# Patient Record
Sex: FEMALE | Race: WHITE | URBAN - METROPOLITAN AREA
[De-identification: names, ages, dates, MRNs, and addresses within clinical notes are randomized per-mention and may not be internally consistent; named-entity substitution may affect disease eponyms.]

---

## 2021-03-08 ENCOUNTER — TRANSFERRED RECORDS (OUTPATIENT)
Dept: HEALTH INFORMATION MANAGEMENT | Facility: CLINIC | Age: 46
End: 2021-03-08

## 2021-03-09 ENCOUNTER — TRANSFERRED RECORDS (OUTPATIENT)
Dept: HEALTH INFORMATION MANAGEMENT | Facility: CLINIC | Age: 46
End: 2021-03-09

## 2021-03-15 ENCOUNTER — TRANSFERRED RECORDS (OUTPATIENT)
Dept: HEALTH INFORMATION MANAGEMENT | Facility: CLINIC | Age: 46
End: 2021-03-15

## 2021-03-17 ENCOUNTER — TRANSFERRED RECORDS (OUTPATIENT)
Dept: HEALTH INFORMATION MANAGEMENT | Facility: CLINIC | Age: 46
End: 2021-03-17

## 2021-03-17 NOTE — TELEPHONE ENCOUNTER
DIAGNOSIS: Left Multi ligament knee damage, referred by Dr. Zia Espinal at [facility]   APPOINTMENT DATE: 3/22/2021   NOTES STATUS DETAILS   OFFICE NOTE from referring provider Care Everywhere [facility]:  3/15/21, 3/8/21 - ORTHO OV with Dr. Quick   OFFICE NOTE from other specialist N/A    DISCHARGE SUMMARY from hospital N/A    DISCHARGE REPORT from the ER N/A    OPERATIVE REPORT N/A    MEDICATION LIST Care Everywhere    EMG (for Spine) N/A    IMPLANT RECORD/STICKER N/A    LABS     CBC/DIFF Care Everywhere 5/14/18   CULTURES N/A    INJECTIONS DONE IN RADIOLOGY N/A    MRI recieved [facility]:  3/9/21 - MRI Knee   CT SCAN N/A    XRAYS (IMAGES & REPORTS) recieved [facility]:  3/8/21 - XR Knee, Left   TUMOR     PATHOLOGY  Slides & report N/A      Records Requested  03/17/21    Facility  [facility]  Fax: [phone]   Outcome * 3/17/21 12:59 PM Faxed urg req to [facility] for images to be pushed to [facility]. - Yodit     Action 3/17/21 RH   Action Taken Imaging received and resolved in PACS

## 2021-03-22 ENCOUNTER — OFFICE VISIT (OUTPATIENT)
Dept: ORTHOPEDICS | Facility: CLINIC | Age: 46
End: 2021-03-22

## 2021-03-22 ENCOUNTER — PRE VISIT (OUTPATIENT)
Dept: ORTHOPEDICS | Facility: CLINIC | Age: 46
End: 2021-03-22

## 2021-03-22 VITALS — WEIGHT: 180 LBS | HEIGHT: 64 IN | BODY MASS INDEX: 30.73 KG/M2

## 2021-03-22 DIAGNOSIS — Z11.59 ENCOUNTER FOR SCREENING FOR OTHER VIRAL DISEASES: ICD-10-CM

## 2021-03-22 DIAGNOSIS — S83.512A RUPTURE OF ANTERIOR CRUCIATE LIGAMENT OF LEFT KNEE, INITIAL ENCOUNTER: Primary | ICD-10-CM

## 2021-03-22 PROCEDURE — 99204 OFFICE O/P NEW MOD 45 MIN: CPT | Mod: GC | Performed by: STUDENT IN AN ORGANIZED HEALTH CARE EDUCATION/TRAINING PROGRAM

## 2021-03-22 RX ORDER — ACETAMINOPHEN 325 MG/1
650 TABLET ORAL EVERY 6 HOURS PRN
COMMUNITY
End: 2021-04-06

## 2021-03-22 ASSESSMENT — MIFFLIN-ST. JEOR: SCORE: 1446.47

## 2021-03-22 NOTE — LETTER
[facility] [employer]  [address], [address]  [place] [address]  Fax: [phone]  Phone: [phone]      2021      Vandana Carrera  [address]  [place] [address]        Dear Vandana Carrera,    Thank you for your interest in becoming a [employer] user!    Your access code is: [email or web address]  Expires: 2021  6:31 AM     Please access the [employer] website at [email or web address].  Below the ID and password fields, select the  Sign Up Now  as New User.  You will be prompted to enter the access code listed above as well as additional personal information.  Please follow the directions carefully when creating your username and password.    If you allow your access code to , or if you have any questions please call a [employer] Representative at [phone] during normal clinic hours.     Sincerely,      [employer]  [facility]

## 2021-03-22 NOTE — PROGRESS NOTES
CHIEF CONCERN:   Chief Complaint   Patient presents with     Consult     Left knee pain        HISTORY:   Vandana Carrera is a 45 year old female who presents to clinic today for evaluation of left knee pain. 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  This caused a violent twist of her knee.  She had to extend her knee and felt like her knee popped back into place.  She was evaluated by after Dr. Zia Espinal was concerned about her posterior lateral corner and MCL and referred her here for further management.  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  Denies any numbness or tingling.      PAST MEDICAL HISTORY: (Reviewed with the patient and in the [facility] medical record)  No past medical history on file.    PAST SURGICAL HISTORY: (Reviewed with the patient and in the [facility] medical record)  No past surgical history on file.    MEDICATIONS: (Reviewed with the patient and in the [facility] medical record)  Notable medications include:  Current Outpatient Medications   Medication Sig Dispense Refill     acetaminophen (TYLENOL) 325 MG tablet Take 650 mg by mouth every 6 hours as needed for mild pain          ALLERGIES: (Reviewed with the patient and in the [facility] medical record)  No Known Allergies    SOCIAL HISTORY: (Reviewed with the patient and in the medical record)  --Tobacco: No  --Occupation: Rancher with beef cattle  --Avocation/Sport: Ranching, horse riding, downhill skiing    FAMILY HISTORY: (Reviewed with the patient and in the medical record)  -- No family history of bleeding, clotting, or difficulty with anesthesia    REVIEW OF SYSTEMS: (Reviewed with the patient and on the health intake form)  -- A comprehensive 10 point review of systems was conducted and is negative except as noted in the HPI    EXAM:   General: Awake, Alert and Oriented, No acute Distress. Articulate  "and Interactive  Ht 1.626 m (5' 4\")   Wt 81.6 kg (180 lb)   BMI 30.90 kg/m       Left lower extremity and knee exam:    Skin is Warm and Well perfused, no suggestion of infection, no previous incisions, bruising present    Injury motion limited by pain.  5 to 50 degrees today    Stable to varus stress.  Opens to valgus stress at 0 and 30 degrees    2B Lachman, negative posterior drawer    Neurovascularly intact distally    IMAGING:  Plain Radiographs: Radiographs of the left knee from 3/8/21  were independently reviewed by me and findings were discussed with the patient today. The imaging demonstrates minimal degeneration and no fractures.    MRI: MRI of the left knee from 3/9/21 were independently reviewed by me and findings were discussed with the patient today. The imaging demonstrates a fully torn proximal MCL and fully torn ACL.     ASSESSMENT:  1. 45 year old female with left ACL tear and left complete proximal MCL tear    PLAN:  1. Operative and nonoperative options were discussed.  The risks and benefits were also discussed.  She would like to proceed with a hamstring autograft ACL reconstruction and an allograft MCL reconstruction with possible meniscal surgery.  Aspirin will be prescribed after the procedure.    Kee Wolfe MD  Fellow, Sports Medicine & Shoulder  [facility]    "

## 2021-03-22 NOTE — PROGRESS NOTES
Patient seen and examined with the resident.     Assesment: Grade 3 rupture of the left anterior cruciate ligament    Grade 3 rupture the left medial collateral ligament    Plan: I long discussion with the patient regarding her left knee.  Reviewed the diagnosis potential treatment options.  I recommend the following course of action: Examination under anesthesia left knee, left knee arthroscopy, ACL reconstruction hamstring autograft, medial collateral ligament reconstruction with allograft.  Meniscus surgery.  I reviewed with her the risk benefits complication techniques and alternatives.  We reviewed expected course of recovery alternative treatment options.  We will look for time to schedule this can be complete.    I agree with history, physical and imaging as well as the assessment and plan as detailed by Dr. Wolfe.

## 2021-03-22 NOTE — LETTER
3/22/2021         RE: Vandana Carrera  [address]  [place] [address]        Dear Colleague,    Thank you for referring your patient, Vandana Carrera, to the [facility]. Please see a copy of my visit note below.    Patient seen and examined with the resident.     Assesment: Grade 3 rupture of the left anterior cruciate ligament    Grade 3 rupture the left medial collateral ligament    Plan: I long discussion with the patient regarding her left knee.  Reviewed the diagnosis potential treatment options.  I recommend the following course of action: Examination under anesthesia left knee, left knee arthroscopy, ACL reconstruction hamstring autograft, medial collateral ligament reconstruction with allograft.  Meniscus surgery.  I reviewed with her the risk benefits complication techniques and alternatives.  We reviewed expected course of recovery alternative treatment options.  We will look for time to schedule this can be complete.    I agree with history, physical and imaging as well as the assessment and plan as detailed by Dr. Wolfe.       CHIEF CONCERN:   Chief Complaint   Patient presents with     Consult     Left knee pain        HISTORY:   Vandana Carrera is a 45 year old female who presents to clinic today for evaluation of left knee pain. 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  This caused a violent twist of her knee.  She had to extend her knee and felt like her knee popped back into place.  She was evaluated by after Dr. Zia Espinal was concerned about her posterior lateral corner and MCL and referred her here for further management.  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  Denies any numbness or tingling.      PAST MEDICAL HISTORY: (Reviewed with the patient and in the [facility] medical record)  No  "past medical history on file.    PAST SURGICAL HISTORY: (Reviewed with the patient and in the [facility] medical record)  No past surgical history on file.    MEDICATIONS: (Reviewed with the patient and in the [facility] medical record)  Notable medications include:  Current Outpatient Medications   Medication Sig Dispense Refill     acetaminophen (TYLENOL) 325 MG tablet Take 650 mg by mouth every 6 hours as needed for mild pain          ALLERGIES: (Reviewed with the patient and in the [facility] medical record)  No Known Allergies    SOCIAL HISTORY: (Reviewed with the patient and in the medical record)  --Tobacco: No  --Occupation: Rancher with beef cattle  --Avocation/Sport: Ranching, horse riding, downhill skiing    FAMILY HISTORY: (Reviewed with the patient and in the medical record)  -- No family history of bleeding, clotting, or difficulty with anesthesia    REVIEW OF SYSTEMS: (Reviewed with the patient and on the health intake form)  -- A comprehensive 10 point review of systems was conducted and is negative except as noted in the HPI    EXAM:   General: Awake, Alert and Oriented, No acute Distress. Articulate and Interactive  Ht 1.626 m (5' 4\")   Wt 81.6 kg (180 lb)   BMI 30.90 kg/m       Left lower extremity and knee exam:    Skin is Warm and Well perfused, no suggestion of infection, no previous incisions, bruising present    Injury motion limited by pain.  5 to 50 degrees today    Stable to varus stress.  Opens to valgus stress at 0 and 30 degrees    2B Lachman, negative posterior drawer    Neurovascularly intact distally    IMAGING:  Plain Radiographs: Radiographs of the left knee from 3/8/21  were independently reviewed by me and findings were discussed with the patient today. The imaging demonstrates minimal degeneration and no fractures.    MRI: MRI of the left knee from 3/9/21 were independently reviewed by me and findings were discussed with the patient today. The imaging demonstrates a fully torn proximal MCL and " fully torn ACL.     ASSESSMENT:  1. 45 year old female with left ACL tear and left complete proximal MCL tear    PLAN:  1. Operative and nonoperative options were discussed.  The risks and benefits were also discussed.  She would like to proceed with a hamstring autograft ACL reconstruction and an allograft MCL reconstruction with possible meniscal surgery.  Aspirin will be prescribed after the procedure.    Kee Wolfe MD  Fellow, Sports Medicine & Shoulder  [facility]        Again, thank you for allowing me to participate in the care of your patient.        Sincerely,        Azar Redd MD

## 2021-03-22 NOTE — NURSING NOTE
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 Health history positive only for long clotting time.      Person(s) involved in teaching:   Patient and      Motivation Level:  Asks Questions: Yes  Eager to Learn: Yes  Cooperative: Yes  Receptive (willing/able to accept information): Yes  Any cultural factors/Adventist beliefs that may influence understanding or compliance? No     Patient and Family demonstrates understanding of the following:  Reason for the appointment, diagnosis and treatment plan: Yes  Knowledge of proper use of medications and conditions for which they are ordered (with special attention to potential side effects or drug interactions): Yes  Which situations necessitate calling provider and whom to contact: Yes     Teaching Concerns Addressed: They understand patient will need a preop exam within 30 days of the date of surgery and to begin physical therapy 3-5 days postop.     Proper use and care of brace/crutches (medical equip, care aids, etc.): Yes  Nutritional needs and diet plan: Yes  Pain management techniques: Yes  Wound Care: Yes  How and/when to access community resources: Yes     Instructional Materials Used/Given: Preoperative teaching packet, surgical soap x2.

## 2021-03-22 NOTE — LETTER
3/22/2021      RE: Vandana Carrera  [address]  [place] [address]       Patient seen and examined with the resident.     Assesment: Grade 3 rupture of the left anterior cruciate ligament    Grade 3 rupture the left medial collateral ligament    Plan: I long discussion with the patient regarding her left knee.  Reviewed the diagnosis potential treatment options.  I recommend the following course of action: Examination under anesthesia left knee, left knee arthroscopy, ACL reconstruction hamstring autograft, medial collateral ligament reconstruction with allograft.  Meniscus surgery.  I reviewed with her the risk benefits complication techniques and alternatives.  We reviewed expected course of recovery alternative treatment options.  We will look for time to schedule this can be complete.    I agree with history, physical and imaging as well as the assessment and plan as detailed by Dr. Wolfe.       CHIEF CONCERN:   Chief Complaint   Patient presents with     Consult     Left knee pain        HISTORY:   Vandana Carrera is a 45 year old female who presents to clinic today for evaluation of left knee pain. 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  This caused a violent twist of her knee.  She had to extend her knee and felt like her knee popped back into place.  She was evaluated by after Dr. Zia Espinal was concerned about her posterior lateral corner and MCL and referred her here for further management.  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  Denies any numbness or tingling.      PAST MEDICAL HISTORY: (Reviewed with the patient and in the [facility] medical record)  No past medical history on file.    PAST SURGICAL HISTORY: (Reviewed with the patient and in the [facility] medical record)  No past surgical history on file.    MEDICATIONS: (Reviewed with the  "patient and in the [facility] medical record)  Notable medications include:  Current Outpatient Medications   Medication Sig Dispense Refill     acetaminophen (TYLENOL) 325 MG tablet Take 650 mg by mouth every 6 hours as needed for mild pain          ALLERGIES: (Reviewed with the patient and in the [facility] medical record)  No Known Allergies    SOCIAL HISTORY: (Reviewed with the patient and in the medical record)  --Tobacco: No  --Occupation: Rancher with beef cattle  --Avocation/Sport: Ranching, horse riding, downhill skiing    FAMILY HISTORY: (Reviewed with the patient and in the medical record)  -- No family history of bleeding, clotting, or difficulty with anesthesia    REVIEW OF SYSTEMS: (Reviewed with the patient and on the health intake form)  -- A comprehensive 10 point review of systems was conducted and is negative except as noted in the HPI    EXAM:   General: Awake, Alert and Oriented, No acute Distress. Articulate and Interactive  Ht 1.626 m (5' 4\")   Wt 81.6 kg (180 lb)   BMI 30.90 kg/m       Left lower extremity and knee exam:    Skin is Warm and Well perfused, no suggestion of infection, no previous incisions, bruising present    Injury motion limited by pain.  5 to 50 degrees today    Stable to varus stress.  Opens to valgus stress at 0 and 30 degrees    2B Lachman, negative posterior drawer    Neurovascularly intact distally    IMAGING:  Plain Radiographs: Radiographs of the left knee from 3/8/21  were independently reviewed by me and findings were discussed with the patient today. The imaging demonstrates minimal degeneration and no fractures.    MRI: MRI of the left knee from 3/9/21 were independently reviewed by me and findings were discussed with the patient today. The imaging demonstrates a fully torn proximal MCL and fully torn ACL.     ASSESSMENT:  1. 45 year old female with left ACL tear and left complete proximal MCL tear    PLAN:  1. Operative and nonoperative options were discussed.  The risks " and benefits were also discussed.  She would like to proceed with a hamstring autograft ACL reconstruction and an allograft MCL reconstruction with possible meniscal surgery.  Aspirin will be prescribed after the procedure.    Kee Wolfe MD  Fellow, Sports Medicine & Shoulder  [facility]        Azar Redd MD

## 2021-03-29 ENCOUNTER — TELEPHONE (OUTPATIENT)
Dept: ORTHOPEDICS | Facility: CLINIC | Age: 46
End: 2021-03-29

## 2021-03-29 NOTE — TELEPHONE ENCOUNTER
Called pt and informed her that I faxed her PT order to the fax number provided. rightfax marked as received/sent. Faxed protocol. Called pt and confirm surgery date, explained why we can't confirm a specific time.     Jossie Magdaleno ATC

## 2021-03-29 NOTE — TELEPHONE ENCOUNTER
Received call from patient to confirm the date of her surgery with Dr Redd. Patient was told she will receive a call 1-2 days before surgery to confirm her check in time, but she should plan to arrive at 8:10am. [place]. Patient is scheduled for her pre-op H&P and COVID test for Friday, April 2nd. Fax number provided to patient to send test results.

## 2021-03-29 NOTE — TELEPHONE ENCOUNTER
[facility] Call Center    Phone Message    May a detailed message be left on voicemail: yes     Reason for Call: Other: Please fax physical therapy order to [facility] at [phone]. Patient also requested that someone call her back to confirm her date and time of surgery.     Action Taken: Message routed to:  [facility] Orthopedics    Travel Screening: Not Applicable

## 2021-04-01 SDOH — HEALTH STABILITY: MENTAL HEALTH: HOW OFTEN DO YOU HAVE A DRINK CONTAINING ALCOHOL?: NEVER

## 2021-04-02 ENCOUNTER — TRANSFERRED RECORDS (OUTPATIENT)
Dept: HEALTH INFORMATION MANAGEMENT | Facility: CLINIC | Age: 46
End: 2021-04-02

## 2021-04-04 ENCOUNTER — ANESTHESIA EVENT (OUTPATIENT)
Dept: SURGERY | Facility: AMBULATORY SURGERY CENTER | Age: 46
End: 2021-04-04

## 2021-04-04 NOTE — ANESTHESIA PREPROCEDURE EVALUATION
Anesthesia Pre-Procedure Evaluation    Patient: Vandana Carrera   MRN: [id number] : 1975        Preoperative Diagnosis: Rupture of anterior cruciate ligament of left knee, initial encounter [S83.512A]   Procedure : Procedure(s):  left knee examination under anesthesia, knee arthroscopy, anterior cruciate ligament reconstruction hamstring autograft  left knee meniscus surgery  medial collateral ligament reconstruction with allograft     No past medical history on file.   History reviewed. No pertinent surgical history.   No Known Allergies   Social History     Tobacco Use     Smoking status: Never Smoker     Smokeless tobacco: Never Used   Substance Use Topics     Alcohol use: Never     Frequency: Never      Wt Readings from Last 1 Encounters:   21 81.6 kg (180 lb)        Anesthesia Evaluation   Pt has had prior anesthetic. Type: General.    No history of anesthetic complications       ROS/MED HX  ENT/Pulmonary:  - neg pulmonary ROS     Neurologic:  - neg neurologic ROS     Cardiovascular:  - neg cardiovascular ROS     METS/Exercise Tolerance: >4 METS Comment: Works on a ranch   Hematologic: Comments: Patient bruises/bleeds easily. Preoperative PTT, INR, CBC all within normal limits.       Musculoskeletal: Comment: ACL tear      GI/Hepatic:  - neg GI/hepatic ROS     Renal/Genitourinary:  - neg Renal ROS     Endo:  - neg endo ROS     Psychiatric/Substance Use:  - neg psychiatric ROS     Infectious Disease:  - neg infectious disease ROS     Malignancy:  - neg malignancy ROS     Other:  - neg other ROS          Physical Exam    Airway        Mallampati: I   TM distance: > 3 FB   Neck ROM: full   Mouth opening: > 3 cm    Respiratory Devices and Support         Dental  no notable dental history         Cardiovascular   cardiovascular exam normal          Pulmonary   pulmonary exam normal                OUTSIDE LABS:  CBC: No results found for: WBC, HGB, HCT, PLT  BMP: No results found for: NA, POTASSIUM,  CHLORIDE, CO2, BUN, CR, GLC  COAGS: No results found for: PTT, INR, FIBR  POC: No results found for: BGM, HCG, HCGS  HEPATIC: No results found for: ALBUMIN, PROTTOTAL, ALT, AST, GGT, ALKPHOS, BILITOTAL, BILIDIRECT, MARYCHUY  OTHER: No results found for: PH, LACT, A1C, MARBIN, PHOS, MAG, LIPASE, AMYLASE, TSH, T4, T3, CRP, SED    Anesthesia Plan    ASA Status:  2   NPO Status:  NPO Appropriate    Anesthesia Type: General.     - Airway: LMA   Induction: Intravenous.   Maintenance: Other.        Consents    Anesthesia Plan(s) and associated risks, benefits, and realistic alternatives discussed. Questions answered and patient/representative(s) expressed understanding.     - Discussed with:  Patient         Postoperative Care    Pain management: Oral pain medications, IV analgesics, Peripheral nerve block (Single Shot).   PONV prophylaxis: Ondansetron (or other 5HT-3), Dexamethasone or Solumedrol, Background Propofol Infusion     Comments:    Discussed risks of general anesthesia, including aspiration pneumonia, sore throat/hoarse voice, abrasions/damage to lips/tongue/teeth, nausea, rare complications (including medication reactions, cardiac, pulmonary).  Discussed preoperative adductor canal nerve block. Patient would like to see how she does without nerve block. She did consent to an adductor canal nerve block in PACU if needed. She would like plain bupivacaine for the block (rather than liposomal bupivacaine).      H&P reviewed: Unable to attach H&P to encounter due to EHR limitations. H&P Update: appropriate H&P reviewed, patient examined. No interval changes since H&P (within 30 days).         Norma Clement MD

## 2021-04-05 RX ORDER — NALOXONE HYDROCHLORIDE 0.4 MG/ML
0.2 INJECTION, SOLUTION INTRAMUSCULAR; INTRAVENOUS; SUBCUTANEOUS
Status: DISCONTINUED | OUTPATIENT
Start: 2021-04-05 | End: 2021-04-07 | Stop reason: HOSPADM

## 2021-04-05 RX ORDER — NALOXONE HYDROCHLORIDE 0.4 MG/ML
0.4 INJECTION, SOLUTION INTRAMUSCULAR; INTRAVENOUS; SUBCUTANEOUS
Status: DISCONTINUED | OUTPATIENT
Start: 2021-04-05 | End: 2021-04-07 | Stop reason: HOSPADM

## 2021-04-06 ENCOUNTER — ANESTHESIA (OUTPATIENT)
Dept: SURGERY | Facility: AMBULATORY SURGERY CENTER | Age: 46
End: 2021-04-06

## 2021-04-06 ENCOUNTER — ANCILLARY PROCEDURE (OUTPATIENT)
Dept: RADIOLOGY | Facility: AMBULATORY SURGERY CENTER | Age: 46
End: 2021-04-06
Attending: ORTHOPAEDIC SURGERY

## 2021-04-06 ENCOUNTER — HOSPITAL ENCOUNTER (OUTPATIENT)
Facility: AMBULATORY SURGERY CENTER | Age: 46
Discharge: HOME | End: 2021-04-06
Attending: ORTHOPAEDIC SURGERY | Admitting: ORTHOPAEDIC SURGERY

## 2021-04-06 VITALS
OXYGEN SATURATION: 98 % | WEIGHT: 190 LBS | TEMPERATURE: 98.2 F | HEIGHT: 64 IN | HEART RATE: 75 BPM | BODY MASS INDEX: 32.44 KG/M2 | RESPIRATION RATE: 12 BRPM | DIASTOLIC BLOOD PRESSURE: 91 MMHG | SYSTOLIC BLOOD PRESSURE: 132 MMHG

## 2021-04-06 DIAGNOSIS — S83.512A RUPTURE OF ANTERIOR CRUCIATE LIGAMENT OF LEFT KNEE, INITIAL ENCOUNTER: Primary | ICD-10-CM

## 2021-04-06 DIAGNOSIS — R52 PAIN: ICD-10-CM

## 2021-04-06 DIAGNOSIS — S83.512A RUPTURE OF ANTERIOR CRUCIATE LIGAMENT OF LEFT KNEE, INITIAL ENCOUNTER: ICD-10-CM

## 2021-04-06 LAB
APTT PPP: 31 SEC (ref 22–37)
ERYTHROCYTE [DISTWIDTH] IN BLOOD BY AUTOMATED COUNT: 12.9 % (ref 10–15)
HCG UR QL: NEGATIVE
HCT VFR BLD AUTO: 42.6 % (ref 35–47)
HGB BLD-MCNC: 14.4 G/DL (ref 11.7–15.7)
INR PPP: 0.96 (ref 0.86–1.14)
INTERNAL QC OK POCT: YES
MCH RBC QN AUTO: 28.9 PG (ref 26.5–33)
MCHC RBC AUTO-ENTMCNC: 33.8 G/DL (ref 31.5–36.5)
MCV RBC AUTO: 85 FL (ref 78–100)
PLATELET # BLD AUTO: 210 10E9/L (ref 150–450)
RBC # BLD AUTO: 4.99 10E12/L (ref 3.8–5.2)
WBC # BLD AUTO: 4.8 10E9/L (ref 4–11)

## 2021-04-06 PROCEDURE — 81025 URINE PREGNANCY TEST: CPT | Performed by: PATHOLOGY

## 2021-04-06 PROCEDURE — 29888 ARTHRS AID ACL RPR/AGMNTJ: CPT | Mod: LT

## 2021-04-06 PROCEDURE — 27427 RECONSTRUCTION KNEE: CPT | Mod: LT,51

## 2021-04-06 DEVICE — IMP ANCHOR ARTHREX ACL TIGHTROPE REPAIR RT W/SU AR-1588RT-J: Type: IMPLANTABLE DEVICE | Site: KNEE | Status: FUNCTIONAL

## 2021-04-06 DEVICE — IMP WASHER ARTHREX SPIKED FOR CANC SCR 18MM AR-1349L: Type: IMPLANTABLE DEVICE | Site: KNEE | Status: FUNCTIONAL

## 2021-04-06 DEVICE — IMPLANTABLE DEVICE: Type: IMPLANTABLE DEVICE | Site: KNEE | Status: FUNCTIONAL

## 2021-04-06 DEVICE — GRAFT TENDON SEMITENDINOSUS 26CM 430355: Type: IMPLANTABLE DEVICE | Site: KNEE | Status: FUNCTIONAL

## 2021-04-06 RX ORDER — OXYCODONE HYDROCHLORIDE 5 MG/1
5-10 TABLET ORAL EVERY 4 HOURS PRN
Qty: 20 TABLET | Refills: 0 | Status: SHIPPED | OUTPATIENT
Start: 2021-04-06

## 2021-04-06 RX ORDER — CEFAZOLIN SODIUM 2 G/50ML
2 SOLUTION INTRAVENOUS
Status: COMPLETED | OUTPATIENT
Start: 2021-04-06 | End: 2021-04-06

## 2021-04-06 RX ORDER — GLYCOPYRROLATE 0.2 MG/ML
INJECTION, SOLUTION INTRAMUSCULAR; INTRAVENOUS PRN
Status: DISCONTINUED | OUTPATIENT
Start: 2021-04-06 | End: 2021-04-06

## 2021-04-06 RX ORDER — SODIUM CHLORIDE, SODIUM LACTATE, POTASSIUM CHLORIDE, CALCIUM CHLORIDE 600; 310; 30; 20 MG/100ML; MG/100ML; MG/100ML; MG/100ML
INJECTION, SOLUTION INTRAVENOUS CONTINUOUS
Status: DISCONTINUED | OUTPATIENT
Start: 2021-04-06 | End: 2021-04-06 | Stop reason: HOSPADM

## 2021-04-06 RX ORDER — GABAPENTIN 300 MG/1
300 CAPSULE ORAL ONCE
Status: COMPLETED | OUTPATIENT
Start: 2021-04-06 | End: 2021-04-06

## 2021-04-06 RX ORDER — LIDOCAINE 40 MG/G
CREAM TOPICAL
Status: DISCONTINUED | OUTPATIENT
Start: 2021-04-06 | End: 2021-04-06 | Stop reason: HOSPADM

## 2021-04-06 RX ORDER — OXYCODONE HYDROCHLORIDE 5 MG/1
5 TABLET ORAL EVERY 4 HOURS PRN
Status: DISCONTINUED | OUTPATIENT
Start: 2021-04-06 | End: 2021-04-07 | Stop reason: HOSPADM

## 2021-04-06 RX ORDER — FENTANYL CITRATE 50 UG/ML
25-50 INJECTION, SOLUTION INTRAMUSCULAR; INTRAVENOUS
Status: DISCONTINUED | OUTPATIENT
Start: 2021-04-06 | End: 2021-04-06 | Stop reason: HOSPADM

## 2021-04-06 RX ORDER — CEFAZOLIN SODIUM 2 G/50ML
2 SOLUTION INTRAVENOUS SEE ADMIN INSTRUCTIONS
Status: DISCONTINUED | OUTPATIENT
Start: 2021-04-06 | End: 2021-04-06 | Stop reason: HOSPADM

## 2021-04-06 RX ORDER — SODIUM CHLORIDE, SODIUM LACTATE, POTASSIUM CHLORIDE, CALCIUM CHLORIDE 600; 310; 30; 20 MG/100ML; MG/100ML; MG/100ML; MG/100ML
INJECTION, SOLUTION INTRAVENOUS CONTINUOUS
Status: DISCONTINUED | OUTPATIENT
Start: 2021-04-06 | End: 2021-04-07 | Stop reason: HOSPADM

## 2021-04-06 RX ORDER — DEXAMETHASONE SODIUM PHOSPHATE 4 MG/ML
INJECTION, SOLUTION INTRA-ARTICULAR; INTRALESIONAL; INTRAMUSCULAR; INTRAVENOUS; SOFT TISSUE PRN
Status: DISCONTINUED | OUTPATIENT
Start: 2021-04-06 | End: 2021-04-06

## 2021-04-06 RX ORDER — AMOXICILLIN 250 MG
1-2 CAPSULE ORAL 2 TIMES DAILY
Qty: 30 TABLET | Refills: 0 | Status: SHIPPED | OUTPATIENT
Start: 2021-04-06

## 2021-04-06 RX ORDER — PROPOFOL 10 MG/ML
INJECTION, EMULSION INTRAVENOUS CONTINUOUS PRN
Status: DISCONTINUED | OUTPATIENT
Start: 2021-04-06 | End: 2021-04-06

## 2021-04-06 RX ORDER — NALOXONE HYDROCHLORIDE 0.4 MG/ML
0.4 INJECTION, SOLUTION INTRAMUSCULAR; INTRAVENOUS; SUBCUTANEOUS
Status: DISCONTINUED | OUTPATIENT
Start: 2021-04-06 | End: 2021-04-07 | Stop reason: HOSPADM

## 2021-04-06 RX ORDER — NALOXONE HYDROCHLORIDE 0.4 MG/ML
0.2 INJECTION, SOLUTION INTRAMUSCULAR; INTRAVENOUS; SUBCUTANEOUS
Status: DISCONTINUED | OUTPATIENT
Start: 2021-04-06 | End: 2021-04-07 | Stop reason: HOSPADM

## 2021-04-06 RX ORDER — KETOROLAC TROMETHAMINE 30 MG/ML
INJECTION, SOLUTION INTRAMUSCULAR; INTRAVENOUS PRN
Status: DISCONTINUED | OUTPATIENT
Start: 2021-04-06 | End: 2021-04-06

## 2021-04-06 RX ORDER — ACETAMINOPHEN 325 MG/1
650 TABLET ORAL EVERY 4 HOURS
Qty: 100 TABLET | Refills: 1 | Status: SHIPPED | OUTPATIENT
Start: 2021-04-06

## 2021-04-06 RX ORDER — IBUPROFEN 600 MG/1
600 TABLET, FILM COATED ORAL EVERY 6 HOURS
Qty: 30 TABLET | Refills: 1 | Status: SHIPPED | OUTPATIENT
Start: 2021-04-06

## 2021-04-06 RX ORDER — ONDANSETRON 4 MG/1
4-8 TABLET, ORALLY DISINTEGRATING ORAL EVERY 8 HOURS PRN
Qty: 20 TABLET | Refills: 0 | Status: SHIPPED | OUTPATIENT
Start: 2021-04-06

## 2021-04-06 RX ORDER — ASPIRIN 81 MG/1
162 TABLET, CHEWABLE ORAL DAILY
Qty: 60 TABLET | Refills: 0 | Status: SHIPPED | OUTPATIENT
Start: 2021-04-07

## 2021-04-06 RX ORDER — ACETAMINOPHEN 325 MG/1
650 TABLET ORAL
Status: DISCONTINUED | OUTPATIENT
Start: 2021-04-06 | End: 2021-04-07 | Stop reason: HOSPADM

## 2021-04-06 RX ORDER — HYDROMORPHONE HYDROCHLORIDE 1 MG/ML
.3-.5 INJECTION, SOLUTION INTRAMUSCULAR; INTRAVENOUS; SUBCUTANEOUS EVERY 10 MIN PRN
Status: DISCONTINUED | OUTPATIENT
Start: 2021-04-06 | End: 2021-04-07 | Stop reason: HOSPADM

## 2021-04-06 RX ORDER — LIDOCAINE HYDROCHLORIDE 20 MG/ML
INJECTION, SOLUTION INFILTRATION; PERINEURAL PRN
Status: DISCONTINUED | OUTPATIENT
Start: 2021-04-06 | End: 2021-04-06

## 2021-04-06 RX ORDER — FLUMAZENIL 0.1 MG/ML
0.2 INJECTION, SOLUTION INTRAVENOUS
Status: DISCONTINUED | OUTPATIENT
Start: 2021-04-06 | End: 2021-04-06 | Stop reason: HOSPADM

## 2021-04-06 RX ORDER — ONDANSETRON 2 MG/ML
4 INJECTION INTRAMUSCULAR; INTRAVENOUS EVERY 30 MIN PRN
Status: DISCONTINUED | OUTPATIENT
Start: 2021-04-06 | End: 2021-04-07 | Stop reason: HOSPADM

## 2021-04-06 RX ORDER — MEPERIDINE HYDROCHLORIDE 25 MG/ML
12.5 INJECTION INTRAMUSCULAR; INTRAVENOUS; SUBCUTANEOUS
Status: DISCONTINUED | OUTPATIENT
Start: 2021-04-06 | End: 2021-04-07 | Stop reason: HOSPADM

## 2021-04-06 RX ORDER — PROPOFOL 10 MG/ML
INJECTION, EMULSION INTRAVENOUS PRN
Status: DISCONTINUED | OUTPATIENT
Start: 2021-04-06 | End: 2021-04-06

## 2021-04-06 RX ORDER — OXYCODONE HYDROCHLORIDE 5 MG/1
5 TABLET ORAL
Status: COMPLETED | OUTPATIENT
Start: 2021-04-06 | End: 2021-04-06

## 2021-04-06 RX ORDER — ONDANSETRON 4 MG/1
4 TABLET, ORALLY DISINTEGRATING ORAL EVERY 30 MIN PRN
Status: DISCONTINUED | OUTPATIENT
Start: 2021-04-06 | End: 2021-04-07 | Stop reason: HOSPADM

## 2021-04-06 RX ORDER — ONDANSETRON 2 MG/ML
INJECTION INTRAMUSCULAR; INTRAVENOUS PRN
Status: DISCONTINUED | OUTPATIENT
Start: 2021-04-06 | End: 2021-04-06

## 2021-04-06 RX ORDER — FENTANYL CITRATE 50 UG/ML
INJECTION, SOLUTION INTRAMUSCULAR; INTRAVENOUS PRN
Status: DISCONTINUED | OUTPATIENT
Start: 2021-04-06 | End: 2021-04-06

## 2021-04-06 RX ORDER — ACETAMINOPHEN 325 MG/1
975 TABLET ORAL ONCE
Status: COMPLETED | OUTPATIENT
Start: 2021-04-06 | End: 2021-04-06

## 2021-04-06 RX ORDER — BUPIVACAINE HYDROCHLORIDE AND EPINEPHRINE 2.5; 5 MG/ML; UG/ML
INJECTION, SOLUTION INFILTRATION; PERINEURAL PRN
Status: DISCONTINUED | OUTPATIENT
Start: 2021-04-06 | End: 2021-04-06 | Stop reason: HOSPADM

## 2021-04-06 RX ADMIN — FENTANYL CITRATE 50 MCG: 50 INJECTION, SOLUTION INTRAMUSCULAR; INTRAVENOUS at 12:20

## 2021-04-06 RX ADMIN — GABAPENTIN 300 MG: 300 CAPSULE ORAL at 08:28

## 2021-04-06 RX ADMIN — OXYCODONE HYDROCHLORIDE 5 MG: 5 TABLET ORAL at 12:07

## 2021-04-06 RX ADMIN — Medication 0.25 MG: at 09:59

## 2021-04-06 RX ADMIN — CEFAZOLIN SODIUM 2 G: 2 SOLUTION INTRAVENOUS at 09:34

## 2021-04-06 RX ADMIN — PROPOFOL 150 MG: 10 INJECTION, EMULSION INTRAVENOUS at 09:22

## 2021-04-06 RX ADMIN — LIDOCAINE HYDROCHLORIDE 60 MG: 20 INJECTION, SOLUTION INFILTRATION; PERINEURAL at 09:22

## 2021-04-06 RX ADMIN — ONDANSETRON 4 MG: 2 INJECTION INTRAMUSCULAR; INTRAVENOUS at 09:19

## 2021-04-06 RX ADMIN — KETOROLAC TROMETHAMINE 30 MG: 30 INJECTION, SOLUTION INTRAMUSCULAR; INTRAVENOUS at 11:15

## 2021-04-06 RX ADMIN — FENTANYL CITRATE 50 MCG: 50 INJECTION, SOLUTION INTRAMUSCULAR; INTRAVENOUS at 09:13

## 2021-04-06 RX ADMIN — SODIUM CHLORIDE, SODIUM LACTATE, POTASSIUM CHLORIDE, CALCIUM CHLORIDE: 600; 310; 30; 20 INJECTION, SOLUTION INTRAVENOUS at 08:41

## 2021-04-06 RX ADMIN — SODIUM CHLORIDE, SODIUM LACTATE, POTASSIUM CHLORIDE, CALCIUM CHLORIDE: 600; 310; 30; 20 INJECTION, SOLUTION INTRAVENOUS at 09:11

## 2021-04-06 RX ADMIN — GLYCOPYRROLATE 0.2 MG: 0.2 INJECTION, SOLUTION INTRAMUSCULAR; INTRAVENOUS at 09:13

## 2021-04-06 RX ADMIN — HYDROMORPHONE HYDROCHLORIDE 0.3 MG: 1 INJECTION, SOLUTION INTRAMUSCULAR; INTRAVENOUS; SUBCUTANEOUS at 12:08

## 2021-04-06 RX ADMIN — FENTANYL CITRATE 25 MCG: 50 INJECTION, SOLUTION INTRAMUSCULAR; INTRAVENOUS at 12:04

## 2021-04-06 RX ADMIN — FENTANYL CITRATE 25 MCG: 50 INJECTION, SOLUTION INTRAMUSCULAR; INTRAVENOUS at 12:00

## 2021-04-06 RX ADMIN — FENTANYL CITRATE 50 MCG: 50 INJECTION, SOLUTION INTRAMUSCULAR; INTRAVENOUS at 09:50

## 2021-04-06 RX ADMIN — Medication 0.25 MG: at 10:04

## 2021-04-06 RX ADMIN — ACETAMINOPHEN 975 MG: 325 TABLET ORAL at 08:28

## 2021-04-06 RX ADMIN — DEXAMETHASONE SODIUM PHOSPHATE 4 MG: 4 INJECTION, SOLUTION INTRA-ARTICULAR; INTRALESIONAL; INTRAMUSCULAR; INTRAVENOUS; SOFT TISSUE at 09:30

## 2021-04-06 RX ADMIN — HYDROMORPHONE HYDROCHLORIDE 0.2 MG: 1 INJECTION, SOLUTION INTRAMUSCULAR; INTRAVENOUS; SUBCUTANEOUS at 12:17

## 2021-04-06 RX ADMIN — PROPOFOL 200 MCG/KG/MIN: 10 INJECTION, EMULSION INTRAVENOUS at 09:22

## 2021-04-06 RX ADMIN — PROPOFOL 50 MG: 10 INJECTION, EMULSION INTRAVENOUS at 09:23

## 2021-04-06 ASSESSMENT — MIFFLIN-ST. JEOR: SCORE: 1491.83

## 2021-04-06 NOTE — DISCHARGE INSTRUCTIONS
[facility]  Home Care Following Anesthesia  For 24 hours after surgery:  1. Get plenty of rest.  A responsible adult must stay with you for at least 24 hours after you leave the surgery center.  2. Do not drive or use heavy equipment.  If you have weakness or tingling, don't drive or use heavy equipment until this feeling goes away.   3. Do not drink alcohol.   4. Avoid strenuous or risky activities.  Ask for help when climbing stairs.  5. You may feel lightheaded.  IF so, sit for a few minutes before standing.  Have someone help you get up.   6. If you have nausea (feel sick to your stomach): Drink only clear liquids such as apple juice, ginger ale, broth or 7-Up.  Rest may also help.  Be sure to drink enough fluids.  Move to a regular diet as you feel able.   7. You may have a slight fever.  Call the doctor if your fever is over 100 F (37.7 C) (taken under the tongue) or lasts longer than 24 hours.  8. You may have a dry mouth, a sore throat, muscle aches or trouble sleeping. These should go away after 24 hours.  9. Do not make important or legal decisions.   10. It is recommendable to avoid smoking.               Tips for taking pain medications  To get the best pain relief possible, remember these points:    Take pain medications as directed, before pain becomes severe.    Pain medication can upset your stomach: taking it with food may help.    Constipation is a common side effect of pain medication. Drink plenty of  fluids.    Eat foods high in fiber. Take a stool softener if recommended by your doctor or pharmacist.    Do not drink alcohol, drive or operate machinery while taking pain medications.    Ask about other ways to control pain, such as with heat, ice or relaxation.    Tylenol/Acetaminophen Consumption  To help encourage the safe use of acetaminophen, the makers of TYLENOL  have lowered the maximum daily dose for single-ingredient Extra Strength TYLENOL   (acetaminophen) products sold in the [place] from 8 pills per day (4,000 mg) to 6 pills per day (3,000 mg). The dosing interval has also changed from 2 pills every 4-6 hours to 2 pills every 6 hours.    If you feel your pain relief is insufficient, you may take Tylenol/Acetaminophen in addition to your narcotic pain medication.     Be careful not to exceed 3,000 mg of Tylenol/Acetaminophen in a 24 hour period from all sources.    If you are taking extra strength Tylenol/acetaminophen (500 mg), the maximum dose is 6 tablets in 24 hours.    If you are taking regular strength acetaminophen (325 mg), the maximum dose is 9 tablets in 24 hours.    Call a doctor for any of the followin. Signs of infection (fever, growing tenderness at the surgery site, a large amount of drainage or bleeding, severe pain, foul-smelling drainage, redness, swelling).  2. It has been over 8 to 10 hours since surgery and you are still not able to urinate (pass water).  3. Headache for over 24 hours.  4. Numbness, tingling or weakness the day after surgery (if you had spinal anesthesia).  5. Signs of Covid-19 infection (temperature over 100 degrees, shortness of breath, cough, loss of taste/smell, generalized body aches, persistent headache, chills, sore throat, nausea/vomiting/diarrhea)  Your doctor is:       Dr. Azar Redd, Orthopaedics: [phone]               Or dial [phone] and ask for the resident on call for:  Orthopaedics  For emergency care, call the:  [facility] [phone] (TTY for hearing impaired: [phone])            Safety Tips for Using Crutches    Crutch Fit:    Assume good standing posture with shoulders relaxed and crutch tips 6-8 inches out from the side of the foot.    The underarm pad should fall 2-3 fingers width below the armpit.    The handgrip is positioned level with the wrist to allow 30  flexion at the elbow.    Safety Tips:    Bear weight on your hands, not on your  "armpits.    Do not add extra padding to the underarm pad. This will, in effect, lengthen the crutches and increase risk of nerve injury.    Wear flat, properly fitting shoes. Do not walk in stocking feet, high heels or slippers.    Household hazards:  --Throw rugs should be removed from floors.  --Stairs should be cleared of obstacles.  --Use extra caution on slippery, highly polished, littered or uneven floor surfaces.  --Check for electric cords.    Check crutch tips for excessive wear and keep wing nuts tight.    While walking, look forward with  head up  and  eyes open.  Take equal length steps.    Use BOTH crutches.    Stairs Sequence:    UP: \"Good\" leg first, followed by  bad  leg, then crutches.    DOWN: Crutches, followed by  bad  leg, \"good\" leg.     Walking with Crutches:    Move both crutches forward at the same time.    Non-Weight Bearing (NWB):  Hold the involved leg up and swing through the crutches with the involved leg. The involved leg does not touch the floor.    Toe Touch Weight Bearing (TTWB): Move the involved leg forward. Rest it lightly on the floor for balance only. Step through the crutches with the uninvolved leg.    Partial Weight Bearing (PWB): Move the involved leg forward. Step down the weight of the leg only.  Step through the crutches with the uninvolved leg.    Weight Bearing As Tolerated (WBAT): Move the involved leg forward. Put as much pressure through the involved leg as you can tolerate comfortably. Then step through the crutches with the uninvolved leg.        "

## 2021-04-06 NOTE — OP NOTE
PREOPERATIVE DIAGNOSIS:   1. Left ACL tear  2. Left MCL tear    POSTOPERATIVE DIAGNOSIS:  1. Left ACL tear  2. Left MCL tear    PROCEDURE:  1. Examination under anesthesia Left Knee  2. Left Knee arthroscopy  3. Left ACL reconstruction Hamstring Autograft  4. Left MCL reconstruction Medial collateral ligament    DATE OF SURGERY: 4/6/2021    SURGEON: Azar Redd MD    ASSISTANT: Jona Lyons PA-C. The Assistance of Ms. Lyons was necessary for patient positioning, arthroscopic visualization, retraction, graft preparation and graft passage.    RESIDENT OR FELLOW: Siddharth Gallegos MD    OPERATIVE INDICATIONS: Vandana Carrera is a pleasant 45 year old female who I saw through my orthopedic clinic with a history, physical, imaging consistent with Left ACL and Left MCL reconstruction.  I reviewed with the patient the risks, benefits, complications, techniques and alternatives to surgery.  We reviewed the expected course of recovery and the potential expected outcomes.  The patient understood both the risks and benefits and desired to proceed despite the risks.    OPERATIVE DETAILS: In the preoperative area the patient's informed consent was reviewed and they desired to proceed.  The Left leg was marked and the patient was in agreement.  The patient was taken to the operating room where a timeout was performed and all parties were in agreement.  Preoperative antibiotics were given within 1 hour of the time of incision.  The patient was placed in the supine position and surrendered to LMA anesthesia.  No tourniquet was applied.  Egg crate was placed beneath the well leg and a side post was utilized.  The operative leg was prepped and draped in the usual sterile fashion.     Examination under anesthesia: Range of motion 0-135, 1 quadrant medial and 2 quadrant lateral translation of the patella, stable to varus and valgus stress testing, stable posterior drawer testing, 2+ anterior drawer testing, 2B Lachman, 1+ pivot  shift    Graft harvest and preparation: A 4 cm incision was made over the anterior medial tibia.  It was carried down through the skin and subcutaneous tissues and meticulous hemostasis was insured.  The fascia was opened with an apex anterior-superior-based incision and the sartorius fascia was identified.  A marking suture was placed at this top corner and the fascia was reflected exposing the semi-tendinosis.  A right angle snap was used to free the semi-tendinosis from the overlying fascia and the adhesions were removed sharply.  Once there was adequate excursion of the tendon across the tibial tubercle, and no tenting of the gastroc fascia a small tendon stripper was introduced and the tendon was harvested free. The same technique was then repeated for the gracilis tendon.    It was taken to the back table where we elected to utilize a standard quadrupled graft given the overall short tendon. It is thought that the hamstring tendons were amputated during harvest is just that the tendon portion was not long enough to perform a quadrupled construct. With 4 strands doubled over an Arthrex tight rope running locking fiber loop was placed on each tail. It the graft sized to a size 8 in both the femur and the tibia. It was tensioned at 20 pounds for 20 minutes to remove any creep.    Anterior medial and anterior lateral arthroscopic portals were created and a diagnostic arthroscopy was performed with the following findings: The medial patella facet, lateral patella facet, central ridge of the patella showed normal cartilage.  The trochlear cartilage grade 2 chondrosis.  The medial femoral condyle showed normal cartilage and medial tibial plateau showed normal cartilage. The lateral femoral condyle showed normal cartilage and lateral tibial plateau showed normal. The Medial meniscus was intact and stable to probing and Lateral Meniscus was intact and stable to probing.  There was a grade 3 rupture of the anterior  cruciate ligament with positive empty wall sign.  The PCL was intact.  There was no opening to varus stress testing. There was significant opening to valgus in the medial compartmenteither.    A debridement of the nonfunctioning ACL was then performed until we could visualize the anatomic insertion sites of the ACL on both the femoral and the tibial origin.  Remnant preservation was pursued in the tibial side and the tibial tunnel was centered midway between the medial and lateral tibial spines in line with the posterior aspect of the anterior horn of the lateral meniscus.    The arthroscope was placed into the medial portal and a 6-9 guide was placed into the lateral portal we selected our position along the lateral femoral wall.  The osseous length measured 35 mm.  A 8 mm flip cutter was then introduced through the lateral wall and a 25 mm socket was reamed.  The flip cutter was placed into the straight position and was replaced with a fiber loop suture.  Arthroscopic visualization showed excellent position of the femoral tunnel.  Excess bone from the reamings was then removed arthroscopically.    The arthroscope was then returned to the lateral portal, a tip to tip guide was introduced.  Our osseous length measured 40 mm. A 2.4 mm drill tip pin was then placed we are satisfied with its position or osseous length measured 50 mm and we reamed an 8 . Our suture from the femoral side was then routed through the tibia.    At this time a cortical button was brought up through the tibia and deployed over the lateral cortex. We then toggled the graft and reduce the entire graft construct into the femoral tunnel. No graft tunnel mismatch. The knee was brought to just short of full extension after tensioning and retensioning the femoral side a guidepin was placed and an 8 x 30 mm bio composite screw was placed with excellent purchase. Arthroscopic evaluation showed clearance along the roof the intercondylar  notch and extension clearance on the lateral wall and PCL in flexion. Good tension to probing.    At this time we proceeded with her medial collateral ligament reconstruction. To do this or semitendinosis hallucis allograft was opened on the back table running locking fiber loop was placed on each tail and we doubled this graft over an Arthrex cortical button/tight rope construct. It was seen to fit through a size 7 sizing guide.    We expose her tibial insertion site by patient placing a bent Joshua retractor. At this time we proceeded to expose the femoral insertion site of her medial collateral ligament. C arm intraoperative fluoroscopy was then brought up into the field and a perfect lateral x-ray was obtained. A spade tip pin was placed at the anatomic insertion of the femoral origin of the medial collateral ligament. Care was taken to avoid our ACL cortical button this was advanced across the knee. Osseous length measured 80 mm. We reamed a 30 mm socket. Passing suture was placed. Her MCL graft was then brought up in the field and our cortical button was deployed over the lateral cortex. Was confirmed in position with C arm intraoperative fluoroscopy. We then brought approximately 20 to 25 mm of graft into the femoral tunnel. With the knee in 30 degrees of flexion, maximum varus we selected our site for our spike ligament washer and the tibial MCL origin. A drill hole was placed bicortical in nature and a.5 millimeter screw and spike ligament washer was placed. Excellent compression was noted. Excellent purchase. Final images showed good position of the hardware. Lachman 0, no pivot shift. Stable to valgus stress testing at 0 and 30 degrees.      Copious irrigation was performed an a layered closure was initiated, sterile dressings were applied and the patient was transferred to the recovery room in stable condition with stable vital signs.    ESTIMATED BLOOD LOSS: 25 mL.    TOURNIQUET TIME: No tourniquet  was placed.    COMPLICATIONS: None apparent.    DRAINS: None.    SPECIMENS: None.     POSTOPERATIVE PLAN:  Weightbearing as tolerated, wean from crutches when able  Hinged knee brace left knee x 6 weeks  No ROM x 1 week, then ROM 0-90 when when sitting or doing PT  Brace on and locked when up and around x 4 weeks then wbat with brace on and unlocked  The goal is to walk into my clinic at 6 weeks with brace on and unlocked  and no crutches  No running until 3 months  No sports until 6 months, return to game competition at 7-10 months  Shower on day 3  Start physical therapy day 3-5

## 2021-04-06 NOTE — ANESTHESIA POSTPROCEDURE EVALUATION
Patient: Vandana Carrera    Procedure(s):  left knee examination under anesthesia, knee arthroscopy, anterior cruciate ligament reconstruction hamstring autograft  medial collateral ligament reconstruction with allograft    Diagnosis:Rupture of anterior cruciate ligament of left knee, initial encounter [S83.512A]  Diagnosis Additional Information: No value filed.    Anesthesia Type:  General    Note:  Disposition: Outpatient   Postop Pain Control: Uneventful            Sign Out: Well controlled pain   PONV: Yes            Symptoms: Nausea only            Sign Out: PONV/POV resolved with treatment   Neuro/Psych: Uneventful            Sign Out: Acceptable/Baseline neuro status   Airway/Respiratory: Uneventful            Sign Out: Acceptable/Baseline resp. status   CV/Hemodynamics: Uneventful            Sign Out: Acceptable CV status   Other NRE: NONE   DID A NON-ROUTINE EVENT OCCUR? No         Last vitals:  Vitals:    04/06/21 1223 04/06/21 1230 04/06/21 1245   BP: (!) 152/88 134/89 (!) 132/91   Pulse:  74 75   Resp: 16 16 12   Temp: 36.6  C (97.8  F) 36.6  C (97.8  F) 36.8  C (98.2  F)   SpO2: 99% 98% 98%       Last vitals prior to Anesthesia Care Transfer:  CRNA VITALS  4/6/2021 1108 - 4/6/2021 1208      4/6/2021             Pulse:  96    SpO2:  99 %    Resp Rate (observed):  27    Resp Rate (set):  10          Electronically Signed By: Dominik Waldrop MD  April 6, 2021  2:54 PM

## 2021-04-22 ENCOUNTER — TRANSFERRED RECORDS (OUTPATIENT)
Dept: HEALTH INFORMATION MANAGEMENT | Facility: CLINIC | Age: 46
End: 2021-04-22

## 2021-05-01 ENCOUNTER — HEALTH MAINTENANCE LETTER (OUTPATIENT)
Age: 46
End: 2021-05-01

## 2021-05-13 DIAGNOSIS — Z98.890 S/P ACL RECONSTRUCTION: Primary | ICD-10-CM

## 2021-05-17 ENCOUNTER — ANCILLARY PROCEDURE (OUTPATIENT)
Dept: GENERAL RADIOLOGY | Facility: CLINIC | Age: 46
End: 2021-05-17

## 2021-05-17 ENCOUNTER — OFFICE VISIT (OUTPATIENT)
Dept: ORTHOPEDICS | Facility: CLINIC | Age: 46
End: 2021-05-17

## 2021-05-17 VITALS — HEIGHT: 64 IN | BODY MASS INDEX: 32.44 KG/M2 | WEIGHT: 190 LBS

## 2021-05-17 DIAGNOSIS — Z98.890 S/P ACL RECONSTRUCTION: ICD-10-CM

## 2021-05-17 DIAGNOSIS — M25.561 RIGHT KNEE PAIN, UNSPECIFIED CHRONICITY: Primary | ICD-10-CM

## 2021-05-17 PROCEDURE — 99024 POSTOP FOLLOW-UP VISIT: CPT | Mod: GC | Performed by: ORTHOPAEDIC SURGERY

## 2021-05-17 PROCEDURE — 73560 X-RAY EXAM OF KNEE 1 OR 2: CPT | Mod: LT | Performed by: RADIOLOGY

## 2021-05-17 ASSESSMENT — MIFFLIN-ST. JEOR: SCORE: 1491.83

## 2021-05-17 NOTE — LETTER
Date:May 21, 2021      Patient was self referred, no letter generated. Do not send.        [facility] Health Information

## 2021-05-17 NOTE — PROGRESS NOTES
DIAGNOSIS:   1. Left ACL tear  2. Left MCL tear    PROCEDURES:  1. Examination under anesthesia Left Knee  2. Left Knee arthroscopy  3. Left ACL reconstruction Hamstring Autograft  4. Left MCL reconstruction Medial collateral ligament    HISTORY:  Vandana Carrera is a 45-year-old female now 6 weeks status post the above procedure who presents today for routine evaluation.  She was seen at her first postoperative visit closer to home.  She reports that overall she is doing very well.  Pain is controlled, did not require any narcotic medications postoperatively, currently managing with Aleve.  She notes that her swelling is improved.  She continues to make good progress with physical therapy, noting her motion is full extension to 110 at best so far with therapy.  She denies any issues with wound healing.  No fevers or chills.  No numbness or tingling distally.  Overall happy with the results thus far.    She reports increased right knee pain, described to be along the medial aspect of the knee.  She does have issues with swelling along with catching/locking sensations.  She reports that as part of her initial skiing injury, the right knee did have a similar mechanism of injury to her left, although the left took most of the trauma.  She denies any previous injuries to the right knee.  No previous injuries.    EXAM:     General: Awake, Alert, and oriented. Articulates and communicates with a normal affect     Left lower Extremity:    Incisions well healed without evidence of infection    Normal post-operative effusion and ecchymosis    Range of motion 0 to 100 degrees    Range of motion and stability exam not performed    Neurovascularly intact     Right lower Extremity:    Mild/moderate effusion    Tenderness palpation over the medial joint line.    Stable with varus valgus stressing at 0 and 30 degrees.  1 a Lachman.  Negative posterior drawer.    Knee motion 0 to 135 degrees    Positive Efrain medial, positive  Thessaly medial    Neurovascularly intact    IMAGING:  Radiographs of the left knee from 5/17/2021 were independently reviewed by me and findings were discussed with the patient today. The imaging demonstrates postsurgical changes status post ACL reconstruction and MCL reconstruction.  Appropriate implant positioning.  No acute or concerning bony abnormalities    ASSESSMENT:  1. 6 weeks status post left knee EUA, ACL reconstruction with hamstring autograft, MCL reconstruction   2. Right knee pain, swelling, concern for meniscal pathology    PLAN:     Weightbearing: WBAT    Range of Motion: No range of motion restrictions    Pain Medications: We reviewed post-operative pain medications at today's visit. The patient has stopped all opioid pain medications and no further refills are required    Extension: We reviewed the importance of full knee extension and demonstrated the relevant exercises as appropriate    Crutches/Brace: Patient no longer requires the hinged knee brace or crutches.     Acitivity Restrictions:    Discussed that this is the dangerous time after ACL reconstruction    Reviewed activity restrictions at today's visit    Goal to progress strength and motion to allow straight line running at three months from the date of surgery    With regards to her right knee pain, concern for meniscal pathology, recommend MRI to be done closer to home.  Hardcopy order for MRI provided today.     Follow up: Virtual visit in 6 weeks    Patient seen and discussed with Dr. Redd, who agrees with plan.     Say Linares MD  Orthopedic Surgery PGY-5

## 2021-05-17 NOTE — PROGRESS NOTES
Patient seen and examined with the resident.     Assesment: 6 weeks following left ACL and MCL.  Doing well.      Plan: Weightbearing: WBAT    Range of Motion: No range of motion restrictions    Pain Medications: We reviewed post-operative pain medications at today's visit. The patient has stopped all opioid pain medications and no further refills are required    Extension: We reviewed the importance of full knee extension and demonstrated the relevant exercises as appropriate    Crutches/Brace: Patient no longer requires the hinged knee brace or crutches.     Acitivity Restrictions:    Discussed that this is the dangerous time after ACL reconstruction    Reviewed activity restrictions at today's visit    Goal to progress strength and motion to allow straight line running at three months from the date of surgery    Follow up: 6 weeks with no new radiographs needed     I agree with history, physical and imaging as well as the assessment and plan as detailed by Dr. Linares.

## 2021-05-17 NOTE — NURSING NOTE
"Reason For Visit:   Chief Complaint   Patient presents with     RECHECK     DOS: 4/9/21 left knee arthroscopy, ACL reconstruction hamstring autograft, MCL reconstruction with allograft         Date of surgery: 4/9/21  Type of surgery:   PROCEDURE:  1. Examination under anesthesia Left Knee  2. Left Knee arthroscopy  3. Left ACL reconstruction Hamstring Autograft  4. Left MCL reconstruction Medial collateral ligament      Pain Assessment  Patient Currently in Pain: Yes  0-10 Pain Scale: 0  Primary Pain Location: Knee    Ht 1.626 m (5' 4\")   Wt 86.2 kg (190 lb)   BMI 32.61 kg/m         No Known Allergies    Current Outpatient Medications   Medication     acetaminophen (TYLENOL) 325 MG tablet     aspirin (ASA) 81 MG chewable tablet     ibuprofen (ADVIL/MOTRIN) 600 MG tablet     ondansetron (ZOFRAN-ODT) 4 MG ODT tab     oxyCODONE (ROXICODONE) 5 MG tablet     senna-docusate (SENOKOT-S/PERICOLACE) 8.6-50 MG tablet     No current facility-administered medications for this visit.          Afshan Marr, ATC    "

## 2021-05-17 NOTE — LETTER
5/17/2021         RE: Vandana Carrera  [address]  [facility] [address]        Dear Colleague,    Thank you for referring your patient, Vandana Carrera, to the [facility] [place]. Please see a copy of my visit note below.    DIAGNOSIS:   1. Left ACL tear  2. Left MCL tear    PROCEDURES:  1. Examination under anesthesia Left Knee  2. Left Knee arthroscopy  3. Left ACL reconstruction Hamstring Autograft  4. Left MCL reconstruction Medial collateral ligament    HISTORY:  Vandana Carrera is a 45-year-old female now 6 weeks status post the above procedure who presents today for routine evaluation.  She was seen at her first postoperative visit closer to home.  She reports that overall she is doing very well.  Pain is controlled, did not require any narcotic medications postoperatively, currently managing with Aleve.  She notes that her swelling is improved.  She continues to make good progress with physical therapy, noting her motion is full extension to 110 at best so far with therapy.  She denies any issues with wound healing.  No fevers or chills.  No numbness or tingling distally.  Overall happy with the results thus far.    She reports increased right knee pain, described to be along the medial aspect of the knee.  She does have issues with swelling along with catching/locking sensations.  She reports that as part of her initial skiing injury, the right knee did have a similar mechanism of injury to her left, although the left took most of the trauma.  She denies any previous injuries to the right knee.  No previous injuries.    EXAM:     General: Awake, Alert, and oriented. Articulates and communicates with a normal affect     Left lower Extremity:    Incisions well healed without evidence of infection    Normal post-operative effusion and ecchymosis    Range of motion 0 to 100 degrees    Range of motion and stability exam not performed    Neurovascularly  intact     Right lower Extremity:    Mild/moderate effusion    Tenderness palpation over the medial joint line.    Stable with varus valgus stressing at 0 and 30 degrees.  1 a Lachman.  Negative posterior drawer.    Knee motion 0 to 135 degrees    Positive Efrain medial, positive Thessaly medial    Neurovascularly intact    IMAGING:  Radiographs of the left knee from 5/17/2021 were independently reviewed by me and findings were discussed with the patient today. The imaging demonstrates postsurgical changes status post ACL reconstruction and MCL reconstruction.  Appropriate implant positioning.  No acute or concerning bony abnormalities    ASSESSMENT:  1. 6 weeks status post left knee EUA, ACL reconstruction with hamstring autograft, MCL reconstruction   2. Right knee pain, swelling, concern for meniscal pathology    PLAN:     Weightbearing: WBAT    Range of Motion: No range of motion restrictions    Pain Medications: We reviewed post-operative pain medications at today's visit. The patient has stopped all opioid pain medications and no further refills are required    Extension: We reviewed the importance of full knee extension and demonstrated the relevant exercises as appropriate    Crutches/Brace: Patient no longer requires the hinged knee brace or crutches.     Acitivity Restrictions:    Discussed that this is the dangerous time after ACL reconstruction    Reviewed activity restrictions at today's visit    Goal to progress strength and motion to allow straight line running at three months from the date of surgery    With regards to her right knee pain, concern for meniscal pathology, recommend MRI to be done closer to home.  Hardcopy order for MRI provided today.     Follow up: Virtual visit in 6 weeks    Patient seen and discussed with Dr. Redd, who agrees with plan.     Say Linares MD  Orthopedic Surgery PGY-5       Patient seen and examined with the resident.     Assesment: 6 weeks following left  ACL and MCL.  Doing well.      Plan: Weightbearing: WBAT    Range of Motion: No range of motion restrictions    Pain Medications: We reviewed post-operative pain medications at today's visit. The patient has stopped all opioid pain medications and no further refills are required    Extension: We reviewed the importance of full knee extension and demonstrated the relevant exercises as appropriate    Crutches/Brace: Patient no longer requires the hinged knee brace or crutches.     Acitivity Restrictions:    Discussed that this is the dangerous time after ACL reconstruction    Reviewed activity restrictions at today's visit    Goal to progress strength and motion to allow straight line running at three months from the date of surgery    Follow up: 6 weeks with no new radiographs needed     I agree with history, physical and imaging as well as the assessment and plan as detailed by Dr. Linares.         Again, thank you for allowing me to participate in the care of your patient.        Sincerely,        Azar Redd MD

## 2021-06-01 ENCOUNTER — TRANSFERRED RECORDS (OUTPATIENT)
Dept: HEALTH INFORMATION MANAGEMENT | Facility: CLINIC | Age: 46
End: 2021-06-01

## 2021-06-30 ENCOUNTER — VIRTUAL VISIT (OUTPATIENT)
Dept: ORTHOPEDICS | Facility: CLINIC | Age: 46
End: 2021-06-30

## 2021-06-30 DIAGNOSIS — Z98.890 S/P ACL RECONSTRUCTION: Primary | ICD-10-CM

## 2021-06-30 PROCEDURE — 99213 OFFICE O/P EST LOW 20 MIN: CPT | Mod: 95 | Performed by: ORTHOPAEDIC SURGERY

## 2021-06-30 NOTE — LETTER
6/30/2021         RE: Vandana Carrera  [address]  [place] [address]        Dear Colleague,    Thank you for referring your patient, Vandana Carrera, to the [facility] [place]. Please see a copy of my visit note below.    Vandana is a 45 year old who is being evaluated via a billable telephone visit.      DIAGNOSIS:   1. Left ACL tear  2. Left MCL tear    PROCEDURES:  1. Examination under anesthesia Left Knee  2. Left Knee arthroscopy  3. Left ACL reconstruction Hamstring Autograft  4. Left MCL reconstruction Medial collateral ligament    HISTORY:  3 months status post the above procedure.  I had a chance to complete a telephone visit today with the patient.  She reports that overall she is doing really quite well in regards to her left knee.  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  She is been very active.  She does have some intermittent pain.  Overall she is very happy with the progress that she is made.  She does desire to return to alpine skiing this winter.    In the course of her postoperative recovery we did obtain an MRI of her contralateral knee.  I did discuss with her local orthopedic surgeon regarding this.  Both of us felt that she had too much medial compartment arthritis to warrant meniscus root repair surgery as her MRI demonstrated a meniscus root tear.  I had a chance to discuss this with the patient as well today.    EXAM:     General: Awake, Alert, and oriented. Articulates and communicates with a normal affect       No examination was completed as this was a telephone visit    IMAGING:  MRI obtained between our last 2 visits does demonstrate high-grade cartilage loss of the medial compartment and what appears to me to be a medial meniscus root tear.  I think that the patient probably has too much arthritis to warrant a meniscus root repair on this side and I think maximization of nonsurgical management should be  our goals    ASSESSMENT:  1. 12 weeks status post left knee EUA, ACL reconstruction with hamstring autograft, MCL reconstruction.  Doing well.  2. Right knee meniscus root tear with high-grade cartilage loss the medial compartment.  I think there is too much arthritis to warrant a meniscus root repair    PLAN:     Weightbearing: WBAT    Range of Motion: No range of motion restrictions.     Acitivity Restrictions:    May do straight line running at this time    No running distance or pace restrictions    No cutting, pivoting, or start-stop running    Goal to build strength, endurance, and confidence to allow sports in 3 months time (6 months from the date of surgery)    Brace: Discussed knee bracing options for sports including neoprene knee sleeve ACL functional bracing.     Discussed post-ACL reconstruction therapy program  Follow up: As needed    I will give her a prescription for an ACL functional brace.    What phone number would you like to be contacted at? [phone]  How would you like to obtain your AVS? [employer]  Phone call duration: 20 minutes          Again, thank you for allowing me to participate in the care of your patient.        Sincerely,        Azar Redd MD

## 2021-06-30 NOTE — LETTER
Date:July 1, 2021      Patient was self referred, no letter generated. Do not send.        [facility] Health Information

## 2021-06-30 NOTE — PROGRESS NOTES
Vandana is a 45 year old who is being evaluated via a billable telephone visit.      DIAGNOSIS:   1. Left ACL tear  2. Left MCL tear    PROCEDURES:  1. Examination under anesthesia Left Knee  2. Left Knee arthroscopy  3. Left ACL reconstruction Hamstring Autograft  4. Left MCL reconstruction Medial collateral ligament    HISTORY:  3 months status post the above procedure.  I had a chance to complete a telephone visit today with the patient.  She reports that overall she is doing really quite well in regards to her left knee.  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  She is been very active.  She does have some intermittent pain.  Overall she is very happy with the progress that she is made.  She does desire to return to alpine skiing this winter.    In the course of her postoperative recovery we did obtain an MRI of her contralateral knee.  I did discuss with her local orthopedic surgeon regarding this.  Both of us felt that she had too much medial compartment arthritis to warrant meniscus root repair surgery as her MRI demonstrated a meniscus root tear.  I had a chance to discuss this with the patient as well today.    EXAM:     General: Awake, Alert, and oriented. Articulates and communicates with a normal affect       No examination was completed as this was a telephone visit    IMAGING:  MRI obtained between our last 2 visits does demonstrate high-grade cartilage loss of the medial compartment and what appears to me to be a medial meniscus root tear.  I think that the patient probably has too much arthritis to warrant a meniscus root repair on this side and I think maximization of nonsurgical management should be our goals    ASSESSMENT:  1. 12 weeks status post left knee EUA, ACL reconstruction with hamstring autograft, MCL reconstruction.  Doing well.  2. Right knee meniscus root tear with high-grade cartilage loss the medial compartment.  I think there is too much arthritis to  warrant a meniscus root repair    PLAN:     Weightbearing: WBAT    Range of Motion: No range of motion restrictions.     Acitivity Restrictions:    May do straight line running at this time    No running distance or pace restrictions    No cutting, pivoting, or start-stop running    Goal to build strength, endurance, and confidence to allow sports in 3 months time (6 months from the date of surgery)    Brace: Discussed knee bracing options for sports including neoprene knee sleeve ACL functional bracing.     Discussed post-ACL reconstruction therapy program  Follow up: As needed    I will give her a prescription for an ACL functional brace.    What phone number would you like to be contacted at? [phone]  How would you like to obtain your AVS? [email or web address]  Phone call duration: 20 minutes

## 2021-10-11 ENCOUNTER — HEALTH MAINTENANCE LETTER (OUTPATIENT)
Age: 46
End: 2021-10-11

## 2022-05-22 ENCOUNTER — HEALTH MAINTENANCE LETTER (OUTPATIENT)
Age: 47
End: 2022-05-22

## 2022-09-25 ENCOUNTER — HEALTH MAINTENANCE LETTER (OUTPATIENT)
Age: 47
End: 2022-09-25

## 2023-06-04 ENCOUNTER — HEALTH MAINTENANCE LETTER (OUTPATIENT)
Age: 48
End: 2023-06-04

## (undated) DEVICE — ESU PENCIL SMOKE EVAC W/ROCKER SWITCH 0703-047-000

## (undated) DEVICE — SU FIBERWIRE 2 38"  AR-7200

## (undated) DEVICE — LINEN GOWN XLG 5407

## (undated) DEVICE — SU VICRYL 2-0 CT-1 27" UND J259H

## (undated) DEVICE — TUBING SYSTEM ARTHREX PATIENT REDEUCE AR-6421

## (undated) DEVICE — PEN MARKING SKIN W/PAPER RULER 31145785

## (undated) DEVICE — TUBING SYSTEM ARTHREX PUMP REDEUCE AR-6411

## (undated) DEVICE — LINEN TOWEL PACK X5 5464

## (undated) DEVICE — ARTHREX FLIPCUTTER III DRILL AR-1204FF

## (undated) DEVICE — PREP DURAPREP REMOVER 4OZ 8611

## (undated) DEVICE — Device

## (undated) DEVICE — SUCTION MANIFOLD NEPTUNE 2 SYS 4 PORT 0702-020-000

## (undated) DEVICE — SOL NACL 0.9% IRRIG 3000ML BAG 2B7477

## (undated) DEVICE — GLOVE PROTEXIS POWDER FREE SMT 8.0  2D72PT80X

## (undated) DEVICE — ABLATOR ARTHREX APOLLO RF MP90 ASPIRATING 90DEG AR-9811

## (undated) DEVICE — SOL NACL 0.9% IRRIG 500ML BOTTLE 2F7123

## (undated) DEVICE — SU ETHIBOND 1 CT-1 30" X425H

## (undated) DEVICE — DRSG STERI STRIP 1/2X4" R1547

## (undated) DEVICE — SU MONOCRYL 3-0 PS-1 27" Y936H

## (undated) DEVICE — LINEN ORTHO PACK 5446

## (undated) DEVICE — PIN GUIDE ARTHREX 2.4MM DRILL  AR-1250L

## (undated) DEVICE — SU ETHILON 3-0 PS-1 18" 1663H

## (undated) DEVICE — PAD ARMBOARD FOAM EGGCRATE COVIDEN 3114367

## (undated) DEVICE — SU VICRYL 0 CT-1 27" UND J260H

## (undated) DEVICE — PACK ARTHROSCOPY CUSTOM ASC

## (undated) DEVICE — SU FIBERWIRE #2 FIBERSTICK 50"  AR-7209

## (undated) DEVICE — ESU GROUND PAD ADULT W/CORD E7507

## (undated) DEVICE — PREP DURAPREP 26ML APL 8630

## (undated) DEVICE — TUBING SUCTION MEDI-VAC 1/4"X20' N620A

## (undated) DEVICE — PIN GUIDE ARTHREX 2.4MM W/EYE BEATH PIN AR-1297L

## (undated) DEVICE — DRAPE TIBURON TOP SHEET 100X60" 29352

## (undated) DEVICE — BUR ARTHREX COOLCUT SABRE 4.0MMX13CM AR-8400SR

## (undated) DEVICE — SU VICRYL 0 CT 36" J358H

## (undated) DEVICE — GLOVE PROTEXIS BLUE W/NEU-THERA 8.0  2D73EB80

## (undated) DEVICE — PACK ACL SUPPLEMENT CUSTOM ASC

## (undated) RX ORDER — GABAPENTIN 300 MG/1
CAPSULE ORAL
Status: DISPENSED
Start: 2021-04-06

## (undated) RX ORDER — HYDROMORPHONE HYDROCHLORIDE 1 MG/ML
INJECTION, SOLUTION INTRAMUSCULAR; INTRAVENOUS; SUBCUTANEOUS
Status: DISPENSED
Start: 2021-04-06

## (undated) RX ORDER — FENTANYL CITRATE 50 UG/ML
INJECTION, SOLUTION INTRAMUSCULAR; INTRAVENOUS
Status: DISPENSED
Start: 2021-04-06

## (undated) RX ORDER — OXYCODONE HYDROCHLORIDE 5 MG/1
TABLET ORAL
Status: DISPENSED
Start: 2021-04-06

## (undated) RX ORDER — LIDOCAINE HYDROCHLORIDE 20 MG/ML
INJECTION, SOLUTION EPIDURAL; INFILTRATION; INTRACAUDAL; PERINEURAL
Status: DISPENSED
Start: 2021-04-06

## (undated) RX ORDER — DEXAMETHASONE SODIUM PHOSPHATE 4 MG/ML
INJECTION, SOLUTION INTRA-ARTICULAR; INTRALESIONAL; INTRAMUSCULAR; INTRAVENOUS; SOFT TISSUE
Status: DISPENSED
Start: 2021-04-06

## (undated) RX ORDER — GLYCOPYRROLATE 0.2 MG/ML
INJECTION INTRAMUSCULAR; INTRAVENOUS
Status: DISPENSED
Start: 2021-04-06

## (undated) RX ORDER — PROPOFOL 10 MG/ML
INJECTION, EMULSION INTRAVENOUS
Status: DISPENSED
Start: 2021-04-06

## (undated) RX ORDER — ONDANSETRON 2 MG/ML
INJECTION INTRAMUSCULAR; INTRAVENOUS
Status: DISPENSED
Start: 2021-04-06

## (undated) RX ORDER — KETOROLAC TROMETHAMINE 30 MG/ML
INJECTION, SOLUTION INTRAMUSCULAR; INTRAVENOUS
Status: DISPENSED
Start: 2021-04-06

## (undated) RX ORDER — CEFAZOLIN SODIUM 2 G/50ML
SOLUTION INTRAVENOUS
Status: DISPENSED
Start: 2021-04-06

## (undated) RX ORDER — ACETAMINOPHEN 325 MG/1
TABLET ORAL
Status: DISPENSED
Start: 2021-04-06